# Patient Record
Sex: MALE | Race: WHITE
[De-identification: names, ages, dates, MRNs, and addresses within clinical notes are randomized per-mention and may not be internally consistent; named-entity substitution may affect disease eponyms.]

---

## 2022-05-13 ENCOUNTER — HOSPITAL ENCOUNTER (EMERGENCY)
Dept: HOSPITAL 76 - ED | Age: 67
Discharge: HOME | End: 2022-05-13
Payer: MEDICARE

## 2022-05-13 VITALS — SYSTOLIC BLOOD PRESSURE: 114 MMHG | DIASTOLIC BLOOD PRESSURE: 69 MMHG

## 2022-05-13 DIAGNOSIS — R33.9: Primary | ICD-10-CM

## 2022-05-13 LAB
ALBUMIN DIAFP-MCNC: 4.6 G/DL (ref 3.2–5.5)
ALBUMIN/GLOB SERPL: 1.8 {RATIO} (ref 1–2.2)
ALP SERPL-CCNC: 41 IU/L (ref 42–121)
ALT SERPL W P-5'-P-CCNC: 22 IU/L (ref 10–60)
ANION GAP SERPL CALCULATED.4IONS-SCNC: 10 MMOL/L (ref 6–13)
AST SERPL W P-5'-P-CCNC: 22 IU/L (ref 10–42)
BASOPHILS NFR BLD AUTO: 0 10^3/UL (ref 0–0.1)
BASOPHILS NFR BLD AUTO: 0.2 %
BILIRUB BLD-MCNC: 1.1 MG/DL (ref 0.2–1)
BUN SERPL-MCNC: 31 MG/DL (ref 6–20)
CALCIUM UR-MCNC: 9.4 MG/DL (ref 8.5–10.3)
CHLORIDE SERPL-SCNC: 103 MMOL/L (ref 101–111)
CLARITY UR REFRACT.AUTO: (no result)
CO2 SERPL-SCNC: 27 MMOL/L (ref 21–32)
CREAT SERPLBLD-SCNC: 1.3 MG/DL (ref 0.6–1.2)
EOSINOPHIL # BLD AUTO: 0 10^3/UL (ref 0–0.7)
EOSINOPHIL NFR BLD AUTO: 0.1 %
ERYTHROCYTE [DISTWIDTH] IN BLOOD BY AUTOMATED COUNT: 12 % (ref 12–15)
GFRSERPLBLD MDRD-ARVRAT: 55 ML/MIN/{1.73_M2} (ref 89–?)
GLOBULIN SER-MCNC: 2.6 G/DL (ref 2.1–4.2)
GLUCOSE SERPL-MCNC: 134 MG/DL (ref 70–100)
GLUCOSE UR QL STRIP.AUTO: NEGATIVE MG/DL
HCT VFR BLD AUTO: 41.1 % (ref 42–52)
HGB UR QL STRIP: 13.9 G/DL (ref 14–18)
KETONES UR QL STRIP.AUTO: NEGATIVE MG/DL
LIPASE SERPL-CCNC: 28 U/L (ref 22–51)
LYMPHOCYTES # SPEC AUTO: 0.8 10^3/UL (ref 1.5–3.5)
LYMPHOCYTES NFR BLD AUTO: 9.1 %
MCH RBC QN AUTO: 31.7 PG (ref 27–31)
MCHC RBC AUTO-ENTMCNC: 33.8 G/DL (ref 32–36)
MCV RBC AUTO: 93.6 FL (ref 80–94)
MONOCYTES # BLD AUTO: 0.6 10^3/UL (ref 0–1)
MONOCYTES NFR BLD AUTO: 6.2 %
NEUTROPHILS # BLD AUTO: 7.5 10^3/UL (ref 1.5–6.6)
NEUTROPHILS # SNV AUTO: 8.9 X10^3/UL (ref 4.8–10.8)
NEUTROPHILS NFR BLD AUTO: 84.2 %
NITRITE UR QL STRIP.AUTO: NEGATIVE
NRBC # BLD AUTO: 0 /100WBC
NRBC # BLD AUTO: 0 X10^3/UL
PDW BLD AUTO: 10.3 FL (ref 7.4–11.4)
PH UR STRIP.AUTO: 6 PH (ref 5–7.5)
PLATELET # BLD: 211 10^3/UL (ref 130–450)
POTASSIUM SERPL-SCNC: 4.2 MMOL/L (ref 3.5–5)
PROT SPEC-MCNC: 7.2 G/DL (ref 6.7–8.2)
PROT UR STRIP.AUTO-MCNC: NEGATIVE MG/DL
RBC # UR STRIP.AUTO: (no result) /UL
RBC # URNS HPF: (no result) /HPF (ref 0–5)
RBC MAR: 4.39 10^6/UL (ref 4.7–6.1)
SODIUM SERPLBLD-SCNC: 140 MMOL/L (ref 135–145)
SP GR UR STRIP.AUTO: 1.01 (ref 1–1.03)
SQUAMOUS URNS QL MICRO: (no result)
UROBILINOGEN UR QL STRIP.AUTO: (no result) E.U./DL
UROBILINOGEN UR STRIP.AUTO-MCNC: NEGATIVE MG/DL
WBC # UR MANUAL: (no result) /HPF (ref 0–3)

## 2022-05-13 PROCEDURE — 85025 COMPLETE CBC W/AUTO DIFF WBC: CPT

## 2022-05-13 PROCEDURE — 81001 URINALYSIS AUTO W/SCOPE: CPT

## 2022-05-13 PROCEDURE — 83690 ASSAY OF LIPASE: CPT

## 2022-05-13 PROCEDURE — 87086 URINE CULTURE/COLONY COUNT: CPT

## 2022-05-13 PROCEDURE — 51798 US URINE CAPACITY MEASURE: CPT

## 2022-05-13 PROCEDURE — 36415 COLL VENOUS BLD VENIPUNCTURE: CPT

## 2022-05-13 PROCEDURE — 99284 EMERGENCY DEPT VISIT MOD MDM: CPT

## 2022-05-13 PROCEDURE — 81003 URINALYSIS AUTO W/O SCOPE: CPT

## 2022-05-13 PROCEDURE — 51702 INSERT TEMP BLADDER CATH: CPT

## 2022-05-13 PROCEDURE — 80053 COMPREHEN METABOLIC PANEL: CPT

## 2022-05-13 PROCEDURE — 99283 EMERGENCY DEPT VISIT LOW MDM: CPT

## 2022-05-13 NOTE — ED PHYSICIAN DOCUMENTATION
History of Present Illness





- Stated complaint


Stated Complaint: UNABLE TO URINATE,ABD/BACK PX





- Chief complaint


Chief Complaint: Abd Pain





- Additonal information


Additional information: 


66-year-old male presents emergency department for evaluation of acute urinary 

retention.  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

cancer.  He is followed by Dr. Hendrix urologist at [facility].  He is

scheduled to have a biopsy in about 3 weeks duration.





This morning he began unable to urinate.  Over the course of the day he has had 

an increasingly distended bladder with discomfort.  No fevers nausea or vomiting

.  He has no previous history of urinary retention or obstruction.








Review of Systems


Constitutional: denies: Fever, Chills


Nose: reports: Reviewed and negative


Throat: reports: Reviewed and negative


Cardiac: reports: Reviewed and negative


Respiratory: reports: Reviewed and negative


: reports: Unable to Void.  denies: Dysuria, Frequency, Incontinent, Hematuria


Skin: reports: Reviewed and negative


Musculoskeletal: reports: Reviewed and negative


Neurologic: reports: Reviewed and negative


Psychiatric: reports: Reviewed and negative





PD PAST MEDICAL HISTORY





- Present Medications


Home Medications: 


                                Ambulatory Orders











 Medication  Instructions  Recorded  Confirmed


 


No Known Home Medications  05/13/22 05/13/22














- Allergies


Allergies/Adverse Reactions: 


                                    Allergies











Allergy/AdvReac Type Severity Reaction Status Date / Time


 


No Known Drug Allergies Allergy   Verified 05/13/22 20:43














PD ED PE EXPANDED





- General


General: Alert, No acute distress, Well developed/nourished





- Cardiac


Cardiac: Regular Rate, Radial strong equal, Pedal strong equal, Cap refill < 2 

sec





- Respiratory


Respiratory: Clear to ausultation jay.  No: Distress, Labored





- Abdomen


Abdomen: Normal Bowel sounds, Tender to palpation (Visibly distended bladder 

mild tenderness to palpation.  Bladder scan reveals more than 1000 mL of urine.)





- Derm


Derm: Normal color, Warm and dry.  No: Rash





- Extremities


Extremities: Normal.  No: Deformity, Tenderness





- Neuro


Neuro: Alert and Oriented X 3, CNII-XII intact





- GCS


Eye Opening: Spontaneous


Motor: Obeys Commands


Verbal: Oriented


Total: 15





Results





- Vitals


Vitals: 


                               Vital Signs - 24 hr











  05/13/22





  20:40


 


Temperature 36.8 C


 


Heart Rate 69


 


Respiratory 16





Rate 


 


Blood Pressure 130/67


 


O2 Saturation 98








                                     Oxygen











O2 Source                      Room air

















- Labs


Labs: 


                                Laboratory Tests











  05/13/22 05/13/22





  20:52 20:52


 


WBC  8.9 


 


RBC  4.39 L 


 


Hgb  13.9 L 


 


Hct  41.1 L 


 


MCV  93.6 


 


MCH  31.7 H 


 


MCHC  33.8 


 


RDW  12.0 


 


Plt Count  211 


 


MPV  10.3 


 


Neut # (Auto)  7.5 H 


 


Lymph # (Auto)  0.8 L 


 


Mono # (Auto)  0.6 


 


Eos # (Auto)  0.0 


 


Baso # (Auto)  0.0 


 


Absolute Nucleated RBC  0.00 


 


Nucleated RBC %  0.0 


 


Sodium   140


 


Potassium   4.2


 


Chloride   103


 


Carbon Dioxide   27


 


Anion Gap   10.0


 


BUN   31 H


 


Creatinine   1.3 H


 


Estimated GFR (MDRD)   55 L


 


Glucose   134 H


 


Calcium   9.4


 


Total Bilirubin   1.1 H


 


AST   22


 


ALT   22


 


Alkaline Phosphatase   41 L


 


Total Protein   7.2


 


Albumin   4.6


 


Globulin   2.6


 


Albumin/Globulin Ratio   1.8


 


Lipase   28














PD MEDICAL DECISION MAKING





- ED course


Complexity details: reviewed results, re-evaluated patient, considered 

differential, d/w patient


ED course: 





66-year-old male who presents emergency department for evaluation of acute 

urinary retention.  He states that he has a history of prostate issues and is 

followed by urology at [facility].  He is scheduled to have a 

biopsy completed in a few weeks.  This morning he began to be unable to fully 

evacuate his bladder and was just dribbling very small amounts of urine.  On 

presentation he had a visibly distended bladder and the bladder scan revealed 

more than 1000 mils of urine.  We did have to place a coud catheter at the 

bedside where 1000 mils of generally clear yellow urine was drained.





Screening labs show mild BUN and creatinine rise of 31 and 1.3 respectively.  I 

suspect that this is likely secondary to the retention and now that the Peterson is

placed should resolve.





I discussed with the patient the need for the coud catheter.  He will call his 

urologist on Monday and arrange sooner follow-up.  Emergent return precautions 

were discussed for failure of the Peterson to drain, fevers or any concerns of 

infection.





Departure





- Departure


Disposition: 01 Home, Self Care


Clinical Impression: 


 Acute urinary retention





Condition: Stable


Record reviewed to determine appropriate education?: Yes


Instructions:  ED Catheter Care Peterson, Leg Bag Care Dc


Follow-Up: 


Oleg Whitfield MD [Physician No Access] - 


Comments: 


Lakhwinder you came to the emergency department today because you are unable to void 

on your own.  This is likely because your prostate has gotten large enough that 

it is now obstructing the flow of urine from your bladder.





We did place a special type of catheter called a coud catheter.  Please discuss

this ED visit with Dr. Whitfield.  He may want to see you sooner than already arranged

for follow-up.  Because of the type of catheter placed I do not recommend that 

it be removed unless under the direction of your urologist.





Today your labs showed a very mild increase in your creatinine and this can be a

sign that the obstruction was traveling to your kidneys.  However once the urine

begins to drain typically this resolves.





In general you can shower normally with a Peterson catheter.  I do recommend that 

you place a thin layer of antibiotic ointment around the tip of your penis and 

the tube to help prevent any irritation.  If you develop fevers, have suddenly 

severe abdominal pain or uncontrolled vomiting return to the ER.





Occasionally when the catheter is in place she can have slightly bloody urine, 

however if you have frankly bloody urine or passing a lot of blood clots or the 

catheter fails to drain you must return to the ER for repeat evaluation.